# Patient Record
Sex: FEMALE | Race: WHITE | NOT HISPANIC OR LATINO | Employment: UNEMPLOYED | URBAN - METROPOLITAN AREA
[De-identification: names, ages, dates, MRNs, and addresses within clinical notes are randomized per-mention and may not be internally consistent; named-entity substitution may affect disease eponyms.]

---

## 2017-05-15 ENCOUNTER — OFFICE VISIT (OUTPATIENT)
Dept: FAMILY MEDICINE | Facility: HOSPITAL | Age: 47
End: 2017-05-15
Attending: FAMILY MEDICINE
Payer: MEDICAID

## 2017-05-15 VITALS
BODY MASS INDEX: 31.17 KG/M2 | SYSTOLIC BLOOD PRESSURE: 152 MMHG | HEART RATE: 97 BPM | HEIGHT: 60 IN | DIASTOLIC BLOOD PRESSURE: 79 MMHG | WEIGHT: 158.75 LBS

## 2017-05-15 DIAGNOSIS — Z00.00 HEALTH CARE MAINTENANCE: Primary | ICD-10-CM

## 2017-05-15 DIAGNOSIS — E10.3593 TYPE 1 DIABETES MELLITUS WITH PROLIFERATIVE RETINOPATHY OF BOTH EYES WITHOUT MACULAR EDEMA: ICD-10-CM

## 2017-05-15 DIAGNOSIS — H66.92 CHRONIC LEFT MIDDLE EAR INFECTION: ICD-10-CM

## 2017-05-15 PROBLEM — E10.39 TYPE 1 DIABETES MELLITUS WITH OPHTHALMIC COMPLICATION: Status: ACTIVE | Noted: 2017-05-15

## 2017-05-15 PROCEDURE — 99203 OFFICE O/P NEW LOW 30 MIN: CPT | Performed by: FAMILY MEDICINE

## 2017-05-15 RX ORDER — DEXAMETHASONE SODIUM PHOSPHATE 1 MG/ML
2 SOLUTION/ DROPS OPHTHALMIC EVERY 12 HOURS
Qty: 5 ML | Refills: 0 | Status: SHIPPED | OUTPATIENT
Start: 2017-05-15 | End: 2017-05-25

## 2017-05-15 RX ORDER — SYRINGE AND NEEDLE,INSULIN,1ML 31GX15/64"
1 SYRINGE, EMPTY DISPOSABLE MISCELLANEOUS
Qty: 100 SYRINGE | Refills: 6 | Status: SHIPPED | OUTPATIENT
Start: 2017-05-15 | End: 2017-05-29

## 2017-05-15 RX ORDER — CIPROFLOXACIN HYDROCHLORIDE 3 MG/ML
4 SOLUTION/ DROPS OPHTHALMIC EVERY 12 HOURS
Qty: 56 DROP | Refills: 0 | Status: SHIPPED | OUTPATIENT
Start: 2017-05-15 | End: 2017-05-22

## 2017-05-15 RX ORDER — INSULIN PUMP SYRINGE, 3 ML
EACH MISCELLANEOUS
Qty: 1 EACH | Refills: 0 | Status: SHIPPED | OUTPATIENT
Start: 2017-05-15 | End: 2017-08-10 | Stop reason: CLARIF

## 2017-05-15 RX ORDER — CIPROFLOXACIN HYDROCHLORIDE 3 MG/ML
1 SOLUTION/ DROPS OPHTHALMIC EVERY 4 HOURS
Qty: 42 DROP | Refills: 0 | Status: SHIPPED | OUTPATIENT
Start: 2017-05-15 | End: 2017-05-15 | Stop reason: SDUPTHER

## 2017-05-15 NOTE — MR AVS SNAPSHOT
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 - Subcutaneous    Pharmacy: [facility] [address] [place], [place] [address] [id number] Ph #: [phone]       insulin regular 100 unit/mL Inj injection 18 mL 3 5/15/2017 2017    Inject 10 Units into the skin 2 (two) times daily before meals. - Subcutaneous    Pharmacy: [facility] [address] [place], [place] [address] - [id number] Ph #: [phone]       blood sugar diagnostic Strp 200 each 3 5/15/2017     1 strip by Misc.(Non-Drug; Combo Route) route 4 (four) times daily before meals and nightly. - Misc.(Non-Drug; Combo Route)    Pharmacy: [facility] [address] [place], [place] [address] - [address] Ph #: [phone]       blood-glucose meter (FREESTYLE SYSTEM KIT) kit 1 each 0 5/15/2017 5/15/2018    Use as instructed    Pharmacy: [facility] [address] [place], [place] [address] - [id number] Ph #: [phone]       lancets 32 gauge Misc 100 each 11 5/15/2017     1  "lancet by Misc.(Non-Drug; Combo Route) route 4 (four) times daily before meals and nightly. - Misc.(Non-Drug; Combo Route)    Pharmacy: [address] Ph #: [phone]       dexamethasone (DECADRON) 0.1 % ophthalmic solution 5 mL 0 5/15/2017 2017    Place 2 drops into both eyes every 12 (twelve) hours. - Both Eyes    Pharmacy: [address] Ph #: [phone]       ciprofloxacin HCl (CILOXAN) 0.3 % ophthalmic solution 56 drop 0 5/15/2017 2017    Place 4 drops into the left ear every 12 (twelve) hours. - Left Ear    Pharmacy: [address] Ph #: [phone]       insulin syringe-needle U-100 0.3 mL 31 gauge x 15/64" Syrg 100 Syringe 6 5/15/2017 5/15/2018    1 application by Misc.(Non-Drug; Combo Route) route 4 (four) times daily before meals and nightly. - Misc.(Non-Drug; Combo Route)    Pharmacy: [address] Ph #: [phone]         [facility]     [facility] On Call Nurse Care Line -  Assistance  Unless otherwise directed by your provider, please contact [facility], our nurse care line that is available for  assistance.     Registered nurses in the [facility] provide: appointment scheduling, clinical advisement, health education, and other advisory services.  Call: [phone] (toll free)               Medications           START taking these NEW medications        Refills    blood sugar diagnostic Strp 3    Si strip by Misc.(Non-Drug; Combo Route) route 4 (four) times daily before meals and nightly.    Class: Normal    Route: Misc.(Non-Drug; Combo Route)    blood-glucose meter (FREESTYLE SYSTEM KIT) kit 0    Sig: Use as instructed    Class: Normal    lancets 32 gauge Misc 11    Si lancet by Misc.(Non-Drug; Combo Route) route 4 (four) times daily before meals and nightly.    Class: Normal    Route: Misc.(Non-Drug; " "Combo Route)    dexamethasone (DECADRON) 0.1 % ophthalmic solution 0    Sig: Place 2 drops into both eyes every 12 (twelve) hours.    Class: Normal    Route: Both Eyes    ciprofloxacin HCl (CILOXAN) 0.3 % ophthalmic solution 0    Sig: Place 4 drops into the left ear every 12 (twelve) hours.    Class: Normal    Route: Left Ear    insulin syringe-needle U-100 0.3 mL 31 gauge x 15/64" Syrg 6    Si application by Misc.(Non-Drug; Combo Route) route 4 (four) times daily before meals and nightly.    Class: Normal    Route: Misc.(Non-Drug; Combo Route)           Verify that the below list of medications is an accurate representation of the medications you are currently taking.  If none reported, the list may be blank. If incorrect, please contact your healthcare provider. Carry this list with you in case of emergency.           Current Medications     insulin NPH (NOVOLIN N) 100 unit/mL injection Inject 10 Units into the skin 2 (two) times daily before meals.    insulin regular 100 unit/mL Inj injection Inject 10 Units into the skin 2 (two) times daily before meals.    blood sugar diagnostic Strp 1 strip by Misc.(Non-Drug; Combo Route) route 4 (four) times daily before meals and nightly.    blood-glucose meter (FREESTYLE SYSTEM KIT) kit Use as instructed    ciprofloxacin HCl (CILOXAN) 0.3 % ophthalmic solution Place 4 drops into the left ear every 12 (twelve) hours.    dexamethasone (DECADRON) 0.1 % ophthalmic solution Place 2 drops into both eyes every 12 (twelve) hours.    furosemide (LASIX) 20 MG tablet Take 1 tablet (20 mg total) by mouth once daily.    insulin syringe-needle U-100 0.3 mL 31 gauge x 15/64" Syrg 1 application by Misc.(Non-Drug; Combo Route) route 4 (four) times daily before meals and nightly.    lancets 32 gauge Misc 1 lancet by Misc.(Non-Drug; Combo Route) route 4 (four) times daily before meals and nightly.           Clinical Reference Information           Your Vitals Were     BP Pulse Height " Weight Last Period BMI    152/79 97 5' (1.524 m) 72 kg (158 lb 11.7 oz) 04/17/2017 31 kg/m2      Blood Pressure          Most Recent Value    BP  (!)  152/79      Allergies as of 5/15/2017     Sulfa (Sulfonamide Antibiotics)      Immunizations Administered on Date of Encounter - 5/15/2017     None      Orders Placed During Today's Visit     Future Labs/Procedures Expected by Expires    CK  5/15/2017 7/14/2018    Hemoglobin A1c  5/15/2017 7/14/2018    Lipid panel  5/15/2017 7/14/2018    Microalbumin/creatinine urine ratio  5/15/2017 7/14/2018    TSH  5/15/2017 7/14/2018      [facility] Sign-Up     Activating your [facility] account is as easy as 1-2-3!     1) Visit [email or web address], select Sign Up Now, enter this activation code and your date of birth, then select Next.  [email or web address]  Expires: 6/8/2017  2:07 PM      2) Create a username and password to use when you visit [facility] in the future and select a security question in case you lose your password and select Next.    3) Enter your e-mail address and click Sign Up!    Additional Information  If you have questions, please e-mail [email or web address] or call [phone] to talk to our [facility] staff. Remember, [facility] is NOT to be used for urgent needs. For medical emergencies, dial 911.         Language Assistance Services     ATTENTION: Language assistance services are available, free of charge. Please call [phone].      ATENCIÓN: Si habla español, tiene a sun disposición servicios gratuitos de asistencia lingüística. Llame al [phone].     KAREN Ý: N?u b?n nói Ti?ng Vi?t, có các d?ch v? h? tr? ngôn ng? mi?n phí dành cho b?n. G?i s? [phone].         [facility] complies with applicable Federal civil rights laws and does not discriminate on the basis of race, color, national origin, age, disability, or sex.

## 2017-05-15 NOTE — PROGRESS NOTES
Subjective:       Patient ID: Sharmaine Matthews is a 47 y.o. female.    Chief Complaint: Establish Care    HPI   46 yo female, w/ h/o T1DM with diabetic retinopathy, presents to [facility]. Patient recently got medicaid and wanted to get a check up. Patient takes NPH 20U bid and novolin 10 units with meals to control her BS. Patient states her last A1c is 10 but has ranged from 6-10. Patient was diagnosed at 10 yrs of age. She was tried on an insulin pump but at that time insulin pumps were massive and patient stated it did not work well for her. She sees an opthlamo for her diabetic retinopathy. She required having laser photocoagulation therapy in both eyes. Patient states she also has this ear ache as well. She went to  and was prescribed amoxil x 7 days. She states the discharge and pain stopped but she still feels a fullness. Patient states she had tubes when she was a little kid due to the amount of ear infections she would get. Patient denies f/c, n/v/d, cp, sob, dizziness, or diaphoresis.     Review of Systems   Constitutional: Negative for chills and fever.   HENT: Positive for ear pain (fulness). Negative for congestion, ear discharge, rhinorrhea, sneezing and sore throat.    Eyes: Negative for photophobia and pain.   Respiratory: Negative for cough, shortness of breath and wheezing.    Cardiovascular: Negative for chest pain, palpitations and leg swelling.   Gastrointestinal: Negative for abdominal pain, constipation, diarrhea, nausea and vomiting.   Genitourinary: Negative for dysuria.   Neurological: Negative for dizziness.       Objective:      Vitals:    05/15/17 1459   BP: (!) 152/79   Pulse: 97     Physical Exam   Constitutional: She is oriented to person, place, and time. No distress.   HENT:   Head: Normocephalic and atraumatic.   Right Ear: Tympanic membrane normal. No drainage or swelling.   Left Ear: There is drainage (minor) and swelling. Tympanic membrane is injected. Decreased hearing is  noted.   Eyes: Pupils are equal, round, and reactive to light.   Neck: Neck supple. No JVD present.   Cardiovascular: Normal rate, regular rhythm, normal heart sounds and intact distal pulses.  Exam reveals no gallop and no friction rub.    No murmur heard.  Pulmonary/Chest: Effort normal and breath sounds normal. She has no wheezes. She has no rales.   Abdominal: Soft. Bowel sounds are normal. She exhibits no distension. There is no tenderness.   Musculoskeletal: She exhibits no edema.   Neurological: She is alert and oriented to person, place, and time.   Skin: Skin is warm and dry.   Psychiatric: She has a normal mood and affect. Her behavior is normal. Judgment and thought content normal.       Assessment:       1. Health care maintenance    2. 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 F/u A1C. Most likely will transition her into long acting insulin after results for better control.  -     Hemoglobin A1c; Future; Expected date: 5/15/17  -     insulin NPH (NOVOLIN N) 100 unit/mL injection; Inject 10 Units into the skin 2 (two) times daily before meals.  Dispense: 18 mL; Refill: 3  -     insulin regular 100 unit/mL Inj injection; Inject 10 Units into the skin 2 (two) times daily before meals.  Dispense: 18 mL; Refill: 3  -     blood sugar diagnostic Strp; 1 strip by Misc.(Non-Drug; Combo Route) route 4 (four) times daily before meals and nightly.  Dispense: 200 each; Refill: 3  -     blood-glucose meter (FREESTYLE SYSTEM KIT) kit; Use as instructed  Dispense: 1 each; Refill: 0  -     lancets 32 gauge Misc; 1 lancet by Misc.(Non-Drug; Combo Route) route 4 (four) times daily before meals and nightly.  Dispense:  "100 each; Refill: 11  -     Lipid panel; Future; Expected date: 5/15/17  -     CK; Future; Expected date: 5/15/17  -     Microalbumin/creatinine urine ratio; Future; Expected date: 5/15/17  -     insulin syringe-needle U-100 0.3 mL 31 gauge x 15/64" Syrg; 1 application by Misc.(Non-Drug; Combo Route) route 4 (four) times daily before meals and nightly.  Dispense: 100 Syringe; Refill: 6    Chronic left middle ear infection  - Was tried on amoxil. Will attempt with cipro drops x 7 days due to patient still having discharge and ear fullness.  -     dexamethasone (DECADRON) 0.1 % ophthalmic solution; Place 2 drops into both eyes every 12 (twelve) hours.  Dispense: 5 mL; Refill: 0  -     ciprofloxacin HCl (CILOXAN) 0.3 % ophthalmic solution; Place 4 drops into the left ear every 12 (twelve) hours.  Dispense: 56 drop; Refill: 0      Return in about 2 weeks (around 5/29/2017).      "

## 2017-05-16 NOTE — PROGRESS NOTES
I assume primary medical responsibility for this patient, I have reviewed the case history, findings, diagnosis and treatment plan with the resident and agree that the care is reasonable and necessary. This service has been performed by a resident without the presence of a teaching physician under the primary care exception  Gypsy Velasquez  5/16/2017

## 2017-05-22 ENCOUNTER — TELEPHONE (OUTPATIENT)
Dept: HEPATOLOGY | Facility: HOSPITAL | Age: 47
End: 2017-05-22

## 2017-05-25 ENCOUNTER — TELEPHONE (OUTPATIENT)
Dept: FAMILY MEDICINE | Facility: HOSPITAL | Age: 47
End: 2017-05-25

## 2017-05-25 NOTE — TELEPHONE ENCOUNTER
----- Message from Katja Umana sent at 5/22/2017  1:27 PM CDT -----  Contact: SELF / [phone]  CALLING REGARDING HER ABNORMAL LABS

## 2017-05-29 ENCOUNTER — OFFICE VISIT (OUTPATIENT)
Dept: FAMILY MEDICINE | Facility: HOSPITAL | Age: 47
End: 2017-05-29
Attending: FAMILY MEDICINE
Payer: MEDICAID

## 2017-05-29 VITALS
HEIGHT: 60 IN | SYSTOLIC BLOOD PRESSURE: 146 MMHG | BODY MASS INDEX: 30.69 KG/M2 | WEIGHT: 156.31 LBS | HEART RATE: 107 BPM | DIASTOLIC BLOOD PRESSURE: 93 MMHG

## 2017-05-29 DIAGNOSIS — E10.3593 TYPE 1 DIABETES MELLITUS WITH PROLIFERATIVE RETINOPATHY OF BOTH EYES WITHOUT MACULAR EDEMA: ICD-10-CM

## 2017-05-29 DIAGNOSIS — H66.002 ACUTE SUPPURATIVE OTITIS MEDIA OF LEFT EAR WITHOUT SPONTANEOUS RUPTURE OF TYMPANIC MEMBRANE, RECURRENCE NOT SPECIFIED: Primary | ICD-10-CM

## 2017-05-29 PROCEDURE — 99213 OFFICE O/P EST LOW 20 MIN: CPT | Performed by: FAMILY MEDICINE

## 2017-05-29 RX ORDER — CALCIUM CARB/VITAMIN D3/VIT K1 500-100-40
TABLET,CHEWABLE ORAL
COMMUNITY
Start: 2017-05-15 | End: 2017-05-29

## 2017-05-29 RX ORDER — ACETAMINOPHEN 160 MG/5ML
1 SUSPENSION, ORAL (FINAL DOSE FORM) ORAL
Qty: 100 EACH | Refills: 1 | Status: SHIPPED | OUTPATIENT
Start: 2017-05-29 | End: 2017-08-07 | Stop reason: SDUPTHER

## 2017-05-29 NOTE — PROGRESS NOTES
Subjective:       Patient ID: Sharmaine Matthews is a 47 y.o. female.    Chief Complaint: Ear Fullness    HPI   48 yo female w/ h/o T1DM, presents for a lab follow up. Patient's A1C is 11.3. Patient states for her that is usually a good value. Patient is adamant she wants to continue her NPH and her sliding scale. She states due to her sliding scale she uses more insulin than is sent to her over the course of 3 months. Patient's other complaint is about her L ear fullness. She states she just feels she has pressure in the ear. She was given cipro at last visit and states she feels the infection has resolved. She denies anymore discharge or f/c.    Review of Systems   Constitutional: Negative for chills and fever.   HENT: Positive for ear pain. Negative for congestion.    Respiratory: Negative for shortness of breath and wheezing.    Cardiovascular: Negative for chest pain, palpitations and leg swelling.   Gastrointestinal: Negative for abdominal pain, constipation, diarrhea, nausea and vomiting.   Genitourinary: Negative for dysuria.   Neurological: Negative for dizziness.       Objective:      Vitals:    05/29/17 1358   BP: (!) 146/93   Pulse: 107     Physical Exam   Constitutional: She is oriented to person, place, and time. She appears well-developed and well-nourished.   HENT:   Head: Normocephalic and atraumatic.   Left Ear: There is swelling. A middle ear effusion is present.   Scarring in L ear   Eyes: Pupils are equal, round, and reactive to light.   Neck: Neck supple. No JVD present.   Cardiovascular: Normal rate, regular rhythm, normal heart sounds and intact distal pulses.  Exam reveals no gallop and no friction rub.    No murmur heard.  Pulmonary/Chest: Effort normal and breath sounds normal. She has no wheezes. She has no rales.   Abdominal: Soft. Bowel sounds are normal. There is no tenderness.   Musculoskeletal: She exhibits no edema.   Neurological: She is alert and oriented to person, place, and time.  "  Skin: Skin is warm and dry.   Psychiatric: She has a normal mood and affect. Her behavior is normal. Judgment and thought content normal.       Assessment:       1. Acute suppurative otitis media of left ear without spontaneous rupture of tympanic membrane, recurrence not specified    2. 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   - Increased the amount of insulin for the patient so she had enough for the 3 months.  -     insulin NPH (NOVOLIN N) 100 unit/mL injection; Inject 10 Units into the skin 3 (three) times daily with meals.  Dispense: 27 mL; Refill: 3  -     insulin regular 100 unit/mL Inj injection; Inject 10 Units into the skin 3 (three) times daily before meals.  Dispense: 27 mL; Refill: 3  -     insulin syringe-needle U-100 0.3 mL 30 gauge x 1/2" Syrg; 1 application by Misc.(Non-Drug; Combo Route) route 4 (four) times daily before meals and nightly.  Dispense: 100 each; Refill: 1      Return in about 3 months (around 8/29/2017).      "

## 2017-05-30 NOTE — PROGRESS NOTES
I assume primary medical responsibility for this patient, I have reviewed the case history, findings, diagnosis and treatment plan with the resident and agree that the care is reasonable and necessary. This service has been performed by a resident without the presence of a teaching physician under the primary care exception  Gypsy Velasquez  5/30/2017

## 2017-08-07 DIAGNOSIS — E10.3593 TYPE 1 DIABETES MELLITUS WITH PROLIFERATIVE RETINOPATHY OF BOTH EYES WITHOUT MACULAR EDEMA: ICD-10-CM

## 2017-08-08 RX ORDER — SYRING-NEEDL,DISP,INSUL,0.3 ML 30 G X1/2"
SYRINGE, EMPTY DISPOSABLE MISCELLANEOUS
Qty: 100 EACH | Refills: 6 | Status: SHIPPED | OUTPATIENT
Start: 2017-08-08 | End: 2017-08-10 | Stop reason: SDUPTHER

## 2017-08-10 ENCOUNTER — OFFICE VISIT (OUTPATIENT)
Dept: FAMILY MEDICINE | Facility: HOSPITAL | Age: 47
End: 2017-08-10
Attending: FAMILY MEDICINE
Payer: MEDICAID

## 2017-08-10 ENCOUNTER — HOSPITAL ENCOUNTER (OUTPATIENT)
Dept: RADIOLOGY | Facility: HOSPITAL | Age: 47
Discharge: HOME | End: 2017-08-10
Attending: FAMILY MEDICINE
Payer: MEDICAID

## 2017-08-10 ENCOUNTER — CLINICAL SUPPORT (OUTPATIENT)
Dept: LAB | Facility: HOSPITAL | Age: 47
End: 2017-08-10
Attending: FAMILY MEDICINE
Payer: MEDICAID

## 2017-08-10 VITALS
BODY MASS INDEX: 31.38 KG/M2 | SYSTOLIC BLOOD PRESSURE: 149 MMHG | DIASTOLIC BLOOD PRESSURE: 76 MMHG | WEIGHT: 159.81 LBS | HEART RATE: 92 BPM | HEIGHT: 60 IN

## 2017-08-10 DIAGNOSIS — Z01.818 PREOPERATIVE CLEARANCE: ICD-10-CM

## 2017-08-10 DIAGNOSIS — I10 ESSENTIAL HYPERTENSION: ICD-10-CM

## 2017-08-10 DIAGNOSIS — E10.3593 TYPE 1 DIABETES MELLITUS WITH PROLIFERATIVE RETINOPATHY OF BOTH EYES WITHOUT MACULAR EDEMA: Primary | ICD-10-CM

## 2017-08-10 DIAGNOSIS — E78.5 HYPERLIPIDEMIA, UNSPECIFIED HYPERLIPIDEMIA TYPE: ICD-10-CM

## 2017-08-10 PROCEDURE — 93005 ELECTROCARDIOGRAM TRACING: CPT

## 2017-08-10 PROCEDURE — 71010 XR CHEST 1 VIEW: CPT | Mod: 26,,, | Performed by: RADIOLOGY

## 2017-08-10 PROCEDURE — 71010 XR CHEST 1 VIEW: CPT | Mod: TC

## 2017-08-10 PROCEDURE — 99213 OFFICE O/P EST LOW 20 MIN: CPT | Mod: 25 | Performed by: FAMILY MEDICINE

## 2017-08-10 RX ORDER — LISINOPRIL 5 MG/1
5 TABLET ORAL DAILY
Qty: 90 TABLET | Refills: 3 | Status: SHIPPED | OUTPATIENT
Start: 2017-08-10 | End: 2018-08-10

## 2017-08-10 RX ORDER — LISINOPRIL 5 MG/1
5 TABLET ORAL DAILY
Qty: 90 TABLET | Refills: 3 | Status: SHIPPED | OUTPATIENT
Start: 2017-08-10 | End: 2017-08-10 | Stop reason: SDUPTHER

## 2017-08-10 RX ORDER — ATORVASTATIN CALCIUM 20 MG/1
20 TABLET, FILM COATED ORAL DAILY
Qty: 90 TABLET | Refills: 3 | Status: SHIPPED | OUTPATIENT
Start: 2017-08-10 | End: 2018-08-10

## 2017-08-10 RX ORDER — OFLOXACIN 3 MG/ML
SOLUTION AURICULAR (OTIC)
COMMUNITY
Start: 2017-08-01 | End: 2017-08-10

## 2017-08-10 RX ORDER — LANCETS 28 GAUGE
EACH MISCELLANEOUS
COMMUNITY

## 2017-08-10 RX ORDER — ATORVASTATIN CALCIUM 20 MG/1
20 TABLET, FILM COATED ORAL DAILY
Qty: 90 TABLET | Refills: 3 | Status: SHIPPED | OUTPATIENT
Start: 2017-08-10 | End: 2017-08-10 | Stop reason: SDUPTHER

## 2017-08-10 RX ORDER — ACETAMINOPHEN 160 MG/5ML
SUSPENSION, ORAL (FINAL DOSE FORM) ORAL
Qty: 100 EACH | Refills: 6 | Status: SHIPPED | OUTPATIENT
Start: 2017-08-10

## 2017-08-10 NOTE — TELEPHONE ENCOUNTER
Patient states her insurance will no longer cover her old glucometer test strips. Requests rx for True Metrix glucometer, test strips, and alcohol pads.

## 2017-08-10 NOTE — PROGRESS NOTES
Subjective:       Patient ID: Sharmaine Matthews is a 47 y.o. female.    Chief Complaint: Follow-up (clearance for surgery)    HPI   Ms. Matthews is here for medical optimization for her tympanoplasty of her left ear. She reports complete hearing loss and ear pain for the past 4 months. Patient reports PSH of adenectomy and tympanostomy at 9 years old without any complications or reaction to anesthesia. She reports sulfa drug allergy. Patient denies any CP, SOB, N/V/D, F/C, dizziness or unintentional weight loss.     Patient has a PMH of DM1 and had last A1C of 11.5. Opthalmic exam performed a couple of weeks ago was normal (per patient). She reports monitoring her glucose levels daily (with am level of 250). BP is 149/76. Last 3 visits, patient has elevated BP.    Review of Systems   Constitutional: Negative for appetite change, chills, fatigue, fever and unexpected weight change.   HENT: Positive for ear pain (left) and hearing loss (Left ). Negative for ear discharge, sinus pressure, sore throat and trouble swallowing.    Eyes: Negative for pain and visual disturbance.   Respiratory: Negative for cough, chest tightness and shortness of breath.    Cardiovascular: Negative for chest pain, palpitations and leg swelling.   Gastrointestinal: Negative for abdominal distention, abdominal pain, constipation, diarrhea, nausea and vomiting.   Genitourinary: Negative for dysuria, enuresis, flank pain, frequency, hematuria and urgency.   Musculoskeletal: Negative for arthralgias, back pain, gait problem, neck pain and neck stiffness.   Neurological: Negative for dizziness, speech difficulty, weakness, numbness and headaches.   Psychiatric/Behavioral: Negative for agitation and confusion. The patient is not hyperactive.        Objective:      Vitals:    08/10/17 0857   BP: (!) 149/76   Pulse: 92     Physical Exam   Constitutional: She is oriented to person, place, and time. She appears well-developed and well-nourished.   HENT:  "  Right Ear: Hearing, tympanic membrane, external ear and ear canal normal.   Left Ear: External ear normal. No drainage. Tympanic membrane is perforated. Decreased hearing is noted.   Eyes: Conjunctivae and EOM are normal. Pupils are equal, round, and reactive to light.   Neck: Normal range of motion.   Cardiovascular: Normal rate, regular rhythm and normal heart sounds.  Exam reveals no gallop and no friction rub.    No murmur heard.  Pulmonary/Chest: Effort normal and breath sounds normal. She has no wheezes. She has no rales.   Abdominal: Soft. Bowel sounds are normal.   Musculoskeletal: Normal range of motion.   Neurological: She is alert and oriented to person, place, and time. She has normal reflexes.   Skin: Skin is warm.   Psychiatric: She has a normal mood and affect. Her behavior is normal. Judgment and thought content normal.       Assessment:       1. 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 Hyperlipidemia, unspecified hyperlipidemia type    3. Preoperative clearance    4. 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  Dispense: 27 mL; Refill: 3  -     insulin regular 100 unit/mL Inj injection; Inject 10 Units into the skin 3 (three) times daily before meals.  Dispense: 27 mL; Refill: 3  -     lancets 32 gauge Misc; 1 lancet by Misc.(Non-Drug; Combo Route) route 4 (four) times daily before meals and nightly.  Dispense: 100 each; Refill: 11  -     blood sugar diagnostic Strp; 1 strip by Misc.(Non-Drug; Combo Route) route 4 (four) times daily before meals and nightly.  Dispense: 200 each; Refill: 3  -     insulin syringe-needle U-100 (SURE COMFORT INSULIN SYRINGE) 0.3 mL 30 gauge x 1/2" Syrg; " use FOUR TIMES A DAY before meals and nightly  Dispense: 100 each; Refill: 6  - Will bring log with her to her next visit. She will have foot exam next visit as part of her exam.     Hyperlipidemia, unspecified hyperlipidemia type  -     atorvastatin (LIPITOR) 20 MG tablet; Take 1 tablet (20 mg total) by mouth once daily.  Dispense: 90 tablet; Refill: 3    Preoperative clearance  -     CBC auto differential; Future; Expected date: 08/10/2017  -     Basic metabolic panel; Future; Expected date: 08/10/2017  -     X-Ray Chest 1 View; Future; Expected date: 08/10/2017  -     EKG 12-lead; Future  - Patient will be back in a week after CBC, BMP, CXR and EKG results are back which were required by the physician performing the procedure.     Essential hypertension  - BP elevated x 3 visits. Will start on lisinopril   -     lisinopril (PRINIVIL,ZESTRIL) 5 MG tablet; Take 1 tablet (5 mg total) by mouth once daily.  Dispense: 90 tablet; Refill: 3      RTC 1 week

## 2017-08-10 NOTE — TELEPHONE ENCOUNTER
----- Message from Karishma Umana sent at 8/10/2017 11:04 AM CDT -----  Contact: self   Pt needs a new glucometer machine, test strips, lancets and alcohol wipes.

## 2017-08-12 RX ORDER — ISOPROPYL ALCOHOL 70 ML/100ML
1 SWAB TOPICAL
Qty: 200 EACH | Refills: 11 | Status: CANCELLED | OUTPATIENT
Start: 2017-08-12

## 2017-08-12 RX ORDER — DEXTROSE 4 G
TABLET,CHEWABLE ORAL
Qty: 1 EACH | Refills: 0 | Status: CANCELLED | OUTPATIENT
Start: 2017-08-12

## 2017-08-15 ENCOUNTER — TELEPHONE (OUTPATIENT)
Dept: FAMILY MEDICINE | Facility: HOSPITAL | Age: 47
End: 2017-08-15

## 2017-08-15 NOTE — TELEPHONE ENCOUNTER
----- Message from Martha Higgins sent at 8/11/2017  4:32 PM CDT -----  Pt need a prescription for true metrix  Meter please fax it to [facility].

## 2017-08-16 ENCOUNTER — OFFICE VISIT (OUTPATIENT)
Dept: FAMILY MEDICINE | Facility: HOSPITAL | Age: 47
End: 2017-08-16
Payer: MEDICAID

## 2017-08-16 VITALS
BODY MASS INDEX: 31.99 KG/M2 | SYSTOLIC BLOOD PRESSURE: 145 MMHG | DIASTOLIC BLOOD PRESSURE: 77 MMHG | WEIGHT: 162.94 LBS | HEART RATE: 86 BPM | HEIGHT: 60 IN

## 2017-08-16 DIAGNOSIS — R80.9 MICROALBUMINURIA: ICD-10-CM

## 2017-08-16 DIAGNOSIS — E78.5 HYPERLIPIDEMIA, UNSPECIFIED HYPERLIPIDEMIA TYPE: ICD-10-CM

## 2017-08-16 DIAGNOSIS — I10 ESSENTIAL HYPERTENSION: ICD-10-CM

## 2017-08-16 DIAGNOSIS — E10.3593 TYPE 1 DIABETES MELLITUS WITH PROLIFERATIVE RETINOPATHY OF BOTH EYES WITHOUT MACULAR EDEMA: Primary | ICD-10-CM

## 2017-08-16 PROCEDURE — 99214 OFFICE O/P EST MOD 30 MIN: CPT | Performed by: FAMILY MEDICINE

## 2017-08-16 RX ORDER — ISOPROPYL ALCOHOL 70 ML/100ML
SWAB TOPICAL
Qty: 140 EACH | Refills: 3 | COMMUNITY
Start: 2017-08-16

## 2017-08-16 RX ORDER — INSULIN PUMP SYRINGE, 3 ML
EACH MISCELLANEOUS
Qty: 1 EACH | Refills: 0 | Status: SHIPPED | OUTPATIENT
Start: 2017-08-16 | End: 2018-08-16

## 2017-08-16 NOTE — PROGRESS NOTES
Subjective:       Patient ID: Sharmaine Matthews is a 47 y.o. female.    Chief Complaint: Other (clearance)    HPI     Ms. Matthews is a 46 yo F here for follow up of required lab work for medical optimization for her tympanoplasty of left ear. She had BMP, CBC and A1C on 8/10/17. Surgical history includes adenectomy and tympanostomy at 9 years old without any complications or reaction to anesthesia. Reports allergy to sulfa drugs. Patient denies any CP, SOB, N/V/D, F/C, dizziness or unintentional weight loss.      Patient has a PMH of T1DM and last A1C was 11.5 on 5/15/17. Patient is currently on NPH 10 U BID and regular insulin 10 U BID. She reports monitoring her glucose levels daily with morning level of 250. Reports not starting lisinopril 5mg daily or lipitor 20 mg daily because of concerns of potential side effects.       Review of Systems   Constitutional: Negative for chills, diaphoresis, fatigue, fever and unexpected weight change.   HENT: Positive for ear pain (left) and hearing loss (left). Negative for ear discharge and sinus pressure.    Eyes: Negative for photophobia, pain and visual disturbance.   Respiratory: Negative for cough, chest tightness and shortness of breath.    Cardiovascular: Negative for chest pain, palpitations and leg swelling.   Gastrointestinal: Negative for abdominal distention, abdominal pain, blood in stool, constipation, diarrhea, nausea and vomiting.   Genitourinary: Negative for difficulty urinating, dysuria, flank pain, hematuria and urgency.   Musculoskeletal: Negative for arthralgias, back pain, joint swelling, myalgias, neck pain and neck stiffness.   Skin: Negative for wound.   Neurological: Negative for dizziness, tremors, seizures, syncope, speech difficulty, weakness, light-headedness, numbness and headaches.   Psychiatric/Behavioral: Negative for agitation, confusion, decreased concentration and hallucinations.       Objective:      Vitals:    08/16/17 0844   BP: (!) 145/77    Pulse: 86     Physical Exam   Constitutional: She is oriented to person, place, and time. She appears well-developed and well-nourished.   HENT:   Head: Normocephalic.   Left Ear: Tympanic membrane is perforated. Decreased hearing is noted.   Eyes: Conjunctivae and EOM are normal. Pupils are equal, round, and reactive to light.   Neck: Normal range of motion. Neck supple.   Cardiovascular: Normal rate, regular rhythm, normal heart sounds and intact distal pulses.  Exam reveals no gallop and no friction rub.    No murmur heard.  Pulmonary/Chest: Effort normal and breath sounds normal. She has no wheezes. She has no rales.   Abdominal: Soft. Bowel sounds are normal. She exhibits no distension. There is no tenderness.   Musculoskeletal: Normal range of motion.   Neurological: She is alert and oriented to person, place, and time.   Skin: Skin is warm and dry.   Psychiatric: She has a normal mood and affect. Her behavior is normal. Judgment and thought content normal.       A1C on 8/10/17: 11.8  Assessment:       1. 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  Dispense: 27 mL; Refill: 3  -     insulin NPH (NOVOLIN N) 100 unit/mL injection; Inject 15 Units into the skin 2 (two) times daily.  Dispense: 27 mL; Refill: 3  -     blood-glucose meter kit; Use as instructed  Dispense: 1 each; Refill: 0  -     alcohol swabs (ALCOHOL PADS) PadM; Use PRN  Dispense: 140 each; Refill: 3  -     Ambulatory consult to Diabetic Education    Patient is medically optimized for surgery. Increased insulin dosage to insulin NPH 15U BID and regular insulin 10U TID. Patient will follow up in 1 month and bring glucose logs. Discussed benefits and important of statin and  ace-inhibitor treatment. Patient stated she will take medication as prescribed.    Patient is medically optimized for tympanoplasty.    RTC in 1 month. Patient stated she would not be able to follow up earlier.

## 2017-08-17 NOTE — PROGRESS NOTES
I was present in clinic when the patient was seen and I discussed the case with  Dr. Kulkarni.  I have reviewed and agree with the assessment and plan.

## 2017-08-17 NOTE — TELEPHONE ENCOUNTER
Called pharmacy. Pt insurance is not able to cover the glucometer. Pharmacy staff states she will work it out with medicaid.

## 2017-08-18 ENCOUNTER — TELEPHONE (OUTPATIENT)
Dept: FAMILY MEDICINE | Facility: HOSPITAL | Age: 47
End: 2017-08-18

## 2017-08-18 NOTE — TELEPHONE ENCOUNTER
----- Message from Dedra Matson sent at 8/14/2017  4:37 PM CDT -----  PT CALL STATING THE PHARMACY IS WAITING ON DOCTOR TO SEND AND ORDER FOR CORRECT METER (TRUE METRIX). PATIENT NEEDS METER ASAP

## 2018-01-30 ENCOUNTER — TELEPHONE (OUTPATIENT)
Dept: FAMILY MEDICINE | Facility: HOSPITAL | Age: 48
End: 2018-01-30

## 2018-01-30 DIAGNOSIS — E10.3593 TYPE 1 DIABETES MELLITUS WITH PROLIFERATIVE RETINOPATHY OF BOTH EYES WITHOUT MACULAR EDEMA: ICD-10-CM

## 2018-01-30 NOTE — TELEPHONE ENCOUNTER
----- Message from Martha Higgins sent at 1/26/2018  4:46 PM CST -----  Pt need refills on her insulin insulin NPH (NOVOLIN N) 100 unit/mL injection  [facility]

## 2020-02-03 ENCOUNTER — TELEPHONE (OUTPATIENT)
Dept: PODIATRY | Facility: CLINIC | Age: 50
End: 2020-02-03

## 2020-02-03 NOTE — TELEPHONE ENCOUNTER
----- Message from Mery Servin DPM sent at 2/3/2020  3:38 PM CST -----  Yes will be happy to.    Thank you   ----- Message -----  From: Tawanda Robles MD  Sent: 2/2/2020  10:00 PM CST  To: Mery Servin DPM    Can you follow up with this patient in clinic? Diabetic, foot infection    sanjuana Robles MD, TYRELL, CPE, FACEP  Department of Emergency Medicine

## 2020-02-03 NOTE — TELEPHONE ENCOUNTER
Called Pt to schedule an appointment. Voicemail box had not been set up and I was unable to leave a message. Will try again to call tomorrow morning.

## 2020-02-04 ENCOUNTER — TELEPHONE (OUTPATIENT)
Dept: PODIATRY | Facility: CLINIC | Age: 50
End: 2020-02-04

## 2020-02-04 NOTE — TELEPHONE ENCOUNTER
"Called Pt regarding scheduling an appointment. Pt stated she did not want to make an appointment at this time. "Im still trying to heal and get rid of this infection" I asked her if she wanted to make an appointment with Dr. Servin that way he could evaluate her. Pt stated "I will give you a call when I am ready to start walking again and when I am ready to see someone." Pt was given phone number and directions to the office.    "

## 2020-02-04 NOTE — TELEPHONE ENCOUNTER
Called Pt in regards to scheduling an appointment, no answer and voicemail box is full. Will try to call again at a later time.

## 2020-02-04 NOTE — TELEPHONE ENCOUNTER
----- Message from Tawanda Robles MD sent at 2/3/2020  9:41 PM CST -----  Thank you    ----- Message -----  From: Mery Servin DPM  Sent: 2/3/2020   3:38 PM CST  To: Elida Osullivan LPN, Tawanda Robles MD, #    Yes will be happy to.    Thank you   ----- Message -----  From: Tawanda Robles MD  Sent: 2/2/2020  10:00 PM CST  To: Mery Servin DPM    Can you follow up with this patient in clinic? Diabetic, foot infection    sanjuana Robles MD, TYRELL, CPE, FACEP  Department of Emergency Medicine

## 2020-02-05 PROBLEM — E87.5 HYPERKALEMIA: Status: ACTIVE | Noted: 2020-02-05

## 2020-02-05 PROBLEM — L08.9 DIABETIC FOOT INFECTION: Status: ACTIVE | Noted: 2020-02-05

## 2020-02-05 PROBLEM — D72.829 LEUKOCYTOSIS: Status: ACTIVE | Noted: 2020-02-05

## 2020-02-05 PROBLEM — L97.425: Status: ACTIVE | Noted: 2020-02-05

## 2020-02-05 PROBLEM — N30.00 ACUTE CYSTITIS WITHOUT HEMATURIA: Status: ACTIVE | Noted: 2020-02-05

## 2020-02-05 PROBLEM — E11.628 DIABETIC FOOT INFECTION: Status: ACTIVE | Noted: 2020-02-05

## 2020-02-05 PROBLEM — E10.10 DIABETIC KETOACIDOSIS WITHOUT COMA ASSOCIATED WITH TYPE 1 DIABETES MELLITUS: Status: ACTIVE | Noted: 2020-02-05

## 2020-02-05 PROBLEM — E10.621: Status: ACTIVE | Noted: 2020-02-05

## 2020-02-06 PROBLEM — G93.41 ACUTE METABOLIC ENCEPHALOPATHY: Status: ACTIVE | Noted: 2020-02-06

## 2020-02-10 PROBLEM — D72.829 LEUKOCYTOSIS: Status: RESOLVED | Noted: 2020-02-05 | Resolved: 2020-02-10

## 2020-02-10 PROBLEM — E87.5 HYPERKALEMIA: Status: RESOLVED | Noted: 2020-02-05 | Resolved: 2020-02-10

## 2020-02-11 PROBLEM — G93.41 ACUTE METABOLIC ENCEPHALOPATHY: Status: RESOLVED | Noted: 2020-02-06 | Resolved: 2020-02-11

## 2020-02-14 ENCOUNTER — PATIENT OUTREACH (OUTPATIENT)
Dept: ADMINISTRATIVE | Facility: CLINIC | Age: 50
End: 2020-02-14

## 2020-02-14 NOTE — PATIENT INSTRUCTIONS
Wound Check After Surgery: Infection   Your surgical wound has become infected. Infection after surgery usually involves just the top layers of skin. Sometimes the infection is deeper in the wound and may involve a collection of fluid or pus. Treatment will depend on the type of infection you have.   Once antibiotic treatment is started the area of redness should not increase. Pain should start to decrease after two days of treatment.   Home Care:   Keep the wound clean and dry. Change the dressing as directed, or sooner, if the dressing becomes wet or stained with blood or fluid.   If you were told to clean the wound:   Remove the old bandage and wash the wound with soap and water.   Clean with hydrogen peroxide on a cotton tip applicator (Q-tip) to remove any crust or drainage.   Apply an antibiotic cream or ointment such as Neosporin or Bacitracin.   Reapply the bandage.  Bathe with a sponge (no shower or tub baths) for the first few days after surgery, or until there is no more drainage from you wound. Then, you may shower, but do not soak the area in water (no baths or swimming) until the sutures, staples or steri-strips are removed and any wound opening has healed and become dry.   If antibiotics have been prescribed, take them exactly as directed until they are all gone.   You may use acetaminophen (Tylenol) or ibuprofen (Motrin, Advil) to control pain, unless another medicine was prescribed. [NOTE: If you have chronic liver or kidney disease or ever had a stomach ulcer or GI bleeding, talk with your doctor before using these medicines.]  Follow Up   with your doctor as scheduled or as advised by our staff for your next wound check or suture/staple/tape removal.   Return Promptly   or contact your doctor if any of the following occurs:   Increasing pain at the site of surgery or pain not controlled by medicine prescribed   Fever of 100.4°F (38ºC) or higher, or as directed by your healthcare provider    Increasing redness around the wound   Fluid, pus or blood that continues to drain from the wound after five days of treatment   Vomiting, constipation or diarrhea   Shortness of breath or chest pain  © 7026-4055 [employer] [address] [place] [address], [place] [address]. All rights reserved. This information is not intended as a substitute for professional medical care. Always follow your healthcare professional's instructions.

## 2020-02-27 ENCOUNTER — OFFICE VISIT (OUTPATIENT)
Dept: PODIATRY | Facility: CLINIC | Age: 50
End: 2020-02-27
Payer: MEDICAID

## 2020-02-27 VITALS — HEART RATE: 74 BPM | DIASTOLIC BLOOD PRESSURE: 86 MMHG | SYSTOLIC BLOOD PRESSURE: 140 MMHG

## 2020-02-27 DIAGNOSIS — Z09 FOLLOW-UP EXAMINATION FOLLOWING SURGERY: Primary | ICD-10-CM

## 2020-02-27 DIAGNOSIS — E10.621 TYPE 1 DIABETES MELLITUS WITH LEFT DIABETIC FOOT ULCER: ICD-10-CM

## 2020-02-27 DIAGNOSIS — L97.529 TYPE 1 DIABETES MELLITUS WITH LEFT DIABETIC FOOT ULCER: ICD-10-CM

## 2020-02-27 PROCEDURE — 99024 POSTOP FOLLOW-UP VISIT: CPT | Mod: ,,, | Performed by: PODIATRIST

## 2020-02-27 PROCEDURE — 99999 PR PBB SHADOW E&M-EST. PATIENT-LVL III: ICD-10-PCS | Mod: PBBFAC,,, | Performed by: PODIATRIST

## 2020-02-27 PROCEDURE — 99213 OFFICE O/P EST LOW 20 MIN: CPT | Mod: PBBFAC,PN | Performed by: PODIATRIST

## 2020-02-27 PROCEDURE — 99024 PR POST-OP FOLLOW-UP VISIT: ICD-10-PCS | Mod: ,,, | Performed by: PODIATRIST

## 2020-02-27 PROCEDURE — 99999 PR PBB SHADOW E&M-EST. PATIENT-LVL III: CPT | Mod: PBBFAC,,, | Performed by: PODIATRIST

## 2020-02-27 RX ORDER — TRAMADOL HYDROCHLORIDE 50 MG/1
50 TABLET ORAL EVERY 12 HOURS PRN
Qty: 20 TABLET | Refills: 0 | Status: SHIPPED | OUTPATIENT
Start: 2020-02-27

## 2020-02-27 NOTE — PROGRESS NOTES
Subjective:      Patient ID: Sharmaine Matthews is a 50 y.o. female.    Chief Complaint: Post-op Evaluation (left foot)    50 y.o. female presenting with postop evaluation s/p L foot DM foot infection I&D and washout. (DOS: 2/11/20 GP: 2/22/20). Has been WB minimally on her heel in sx shoe. With her mother. Recently came back from [place]. Pain is controlled. Requesting some more pain med. Taking oral abx. Today is the last day.     Hemoglobin A1C   Date Value Ref Range Status   02/06/2020 13.4 (H) 4.0 - 5.6 % Final     Comment:     ADA Screening Guidelines:  5.7-6.4%  Consistent with prediabetes  >or=6.5%  Consistent with diabetes  High levels of fetal hemoglobin interfere with the HbA1C  assay. Heterozygous hemoglobin variants (HbS, HgC, etc)do  not significantly interfere with this assay.   However, presence of multiple variants may affect accuracy.     08/10/2017 11.8 (H) 4.0 - 5.6 % Final     Comment:     According to ADA guidelines, hemoglobin A1c <7.0% represents  optimal control in non-pregnant diabetic patients. Different  metrics may apply to specific patient populations.   Standards of Medical Care in Diabetes-2016.  For the purpose of screening for the presence of diabetes:  <5.7%     Consistent with the absence of diabetes  5.7-6.4%  Consistent with increasing risk for diabetes   (prediabetes)  >or=6.5%  Consistent with diabetes  Currently, no consensus exists for use of hemoglobin A1c  for diagnosis of diabetes for children.  This Hemoglobin A1c assay has significant interference with fetal   hemoglobin   (HbF). The results are invalid for patients with abnormal amounts of   HbF,   including those with known Hereditary Persistence   of Fetal Hemoglobin. Heterozygous hemoglobin variants (HbAS, HbAC,   HbAD, HbAE, HbA2) do not significantly interfere with this assay;   however, presence of multiple variants in a sample may impact the %   interference.     05/15/2017 11.5 (H) 4.5 - 6.2 % Final     Comment:      According to ADA guidelines, hemoglobin A1C <7.0% represents  optimal control in non-pregnant diabetic patients.  Different  metrics may apply to specific populations.   Standards of Medical Care in Diabetes - 2016.  For the purpose of screening for the presence of diabetes:  <5.7%     Consistent with the absence of diabetes  5.7-6.4%  Consistent with increasing risk for diabetes   (prediabetes)  >or=6.5%  Consistent with diabetes  Currently no consensus exists for use of hemoglobin A1C  for diagnosis of diabetes for children.         Review of Systems   Constitution: Negative for chills, decreased appetite, fever and malaise/fatigue.   HENT: Negative for congestion, ear discharge and sore throat.    Eyes: Negative for discharge and pain.   Cardiovascular: Negative for chest pain, claudication and leg swelling.   Respiratory: Negative for cough and shortness of breath.    Skin: Negative for color change, nail changes and rash.   Musculoskeletal: Negative for arthritis, joint pain, joint swelling and muscle weakness.        L foot pain    Gastrointestinal: Negative for bloating, abdominal pain, diarrhea, nausea and vomiting.   Genitourinary: Negative for flank pain and hematuria.   Neurological: Negative for headaches, numbness and weakness.   Psychiatric/Behavioral: Negative for altered mental status.             Past Medical History:   Diagnosis Date    Diabetes mellitus        Past Surgical History:   Procedure Laterality Date    DEBRIDEMENT OF FOOT Left 2/7/2020    Procedure: DEBRIDEMENT, FOOT;  Surgeon: Mery Servin DPM;  [facility];  Service: Podiatry;  Laterality: Left;    IRRIGATION AND DEBRIDEMENT OF LOWER EXTREMITY Left 2/11/2020    Procedure: IRRIGATION AND DEBRIDEMENT, LOWER EXTREMITY;  Surgeon: Mery Servin DPM;  [facility];  Service: Podiatry;  Laterality: Left;       History reviewed. No pertinent family history.    Social History     Socioeconomic History    Marital status: Single  "    Spouse name: Not on file    Number of children: Not on file    Years of education: Not on file    Highest education level: Not on file   Occupational History    Not on file   Social Needs    Financial resource strain: Not on file    Food insecurity:     Worry: Not on file     Inability: Not on file    Transportation needs:     Medical: Not on file     Non-medical: Not on file   Tobacco Use    Smoking status: Never Smoker   Substance and Sexual Activity    Alcohol use: No    Drug use: No    Sexual activity: Not on file   Lifestyle    Physical activity:     Days per week: Not on file     Minutes per session: Not on file    Stress: Not on file   Relationships    Social connections:     Talks on phone: Not on file     Gets together: Not on file     Attends Cheondoism service: Not on file     Active member of club or organization: Not on file     Attends meetings of clubs or organizations: Not on file     Relationship status: Not on file   Other Topics Concern    Not on file   Social History Narrative    Not on file       Current Outpatient Medications   Medication Sig Dispense Refill    BLOOD SUGAR DIAGNOSTIC (TRUE METRIX GLUCOSE TEST STRIP MISC) 1 strip by Misc.(Non-Drug; Combo Route) route 4 (four) times daily before meals and nightly.      BLOOD-GLUCOSE METER (TRUE METRIX GLUCOSE METER MISC) Check blood sugar before each meal and nightly.      doxycycline (MONODOX) 100 MG capsule Take 1 capsule (100 mg total) by mouth every 12 (twelve) hours. 28 capsule 0    insulin  unit/mL injection Inject 20 Units into the skin 2 (two) times daily. for 25 days 10 mL 0    insulin syringe-needle U-100 (SURE COMFORT INSULIN SYRINGE) 0.3 mL 30 gauge x 1/2" Syrg use FOUR TIMES A DAY before meals and nightly 100 each 6    lancets (FREESTYLE LANCETS) 28 gauge Misc by Misc.(Non-Drug; Combo Route) route.      lancets 32 gauge Misc 1 lancet by Misc.(Non-Drug; Combo Route) route 4 (four) times daily before " meals and nightly. 100 each 11    traMADol (ULTRAM) 50 mg tablet Take 1 tablet (50 mg total) by mouth every 12 (twelve) hours as needed. 20 tablet 0    alcohol swabs (ALCOHOL PADS) PadM Use PRN (Patient not taking: Reported on 2/27/2020) 140 each 3    atorvastatin (LIPITOR) 20 MG tablet Take 1 tablet (20 mg total) by mouth once daily. 90 tablet 3    furosemide (LASIX) 20 MG tablet Take 1 tablet (20 mg total) by mouth once daily. 7 tablet 0    insulin regular 100 unit/mL Inj injection Inject 10 Units into the skin 3 (three) times daily before meals. 27 mL 3    lisinopril (PRINIVIL,ZESTRIL) 5 MG tablet Take 1 tablet (5 mg total) by mouth once daily. 90 tablet 3     No current facility-administered medications for this visit.        Review of patient's allergies indicates:   Allergen Reactions    Sulfa (sulfonamide antibiotics)        Vitals:    02/27/20 1049   BP: (!) 140/86   Pulse: 74       Objective:      Physical Exam   Constitutional: She is oriented to person, place, and time. She appears well-developed and well-nourished. No distress.   HENT:   Nose: Nose normal.   Eyes: Conjunctivae are normal.   Neck: Normal range of motion.   Pulmonary/Chest: Effort normal. She exhibits no tenderness.   Abdominal: There is no tenderness.   Neurological: She is alert and oriented to person, place, and time.   Psychiatric: She has a normal mood and affect. Her behavior is normal.     Vascular: Distal DP/PT pulses palpable 2/4. CRT < 3 sec to tips of toes. No vericosities noted to LEs. Hair growth present LE, warm to touch LE,   Left foot:  mild edema plantar aspect of the midfoot     Dermatologic:   Left foot: s/p L foot I&D, wound plantar midfoot. Suture c/d/i. No wound dehiscence, mild rubor but no erythema plantar foot.       Musculoskeletal: Passive range of motion of ankle and pedal joints is painless. No calf tenderness LE, Compartments soft/compressible. .      Neurological: Light touch, proprioception, and  sharp/dull sensation are all intact. Protective threshold with the Macon-Wienstein monofilament is intact b/l. Vibratory sensation intact.                Assessment:       Encounter Diagnoses   Name Primary?    Follow-up examination following surgery Yes    Type 1 diabetes mellitus with left diabetic foot ulcer          Plan:       Sharmaine was seen today for post-op evaluation.    Diagnoses and all orders for this visit:    Follow-up examination following surgery    Type 1 diabetes mellitus with left diabetic foot ulcer    Other orders  -     traMADol (ULTRAM) 50 mg tablet; Take 1 tablet (50 mg total) by mouth every 12 (twelve) hours as needed.      I counseled the patient on her conditions, their implications and medical management.    50 y.o. female with s/p L foot I&D/WO with primary closure.    -suture removed. Tolerated well. Verbal consent was obtained. Wound healed. WBAT in CAM. Short CAM dispensed. LWC with betadine with band aid.   -rx. Tramadol    -Advised for optimal glucose control and maintenance per primary care physician. Patient was also educated on healthy diet that is naturally rich in nutrients and low in fat and calories.   -The nature of the condition, options for management, as well as potential risks and complications were discussed in detail with patient. Patient was amenable to my recommendations and left my office fully informed and will follow up as instructed or sooner if necessary.    -Patient was advised of signs and symptoms of infection including redness, drainage, purulence, odor, streaking, fever, chills and I advised patient to seek medical attention (ER or urgent care) if these symptoms arise.   -f/u 3 weeks     Note dictated with voice recognition software, please excuse any grammatical errors.

## 2020-03-18 ENCOUNTER — OFFICE VISIT (OUTPATIENT)
Dept: PODIATRY | Facility: CLINIC | Age: 50
End: 2020-03-18
Payer: MEDICAID

## 2020-03-18 VITALS
HEIGHT: 60 IN | HEART RATE: 100 BPM | BODY MASS INDEX: 30.57 KG/M2 | DIASTOLIC BLOOD PRESSURE: 84 MMHG | SYSTOLIC BLOOD PRESSURE: 131 MMHG

## 2020-03-18 DIAGNOSIS — L97.529 TYPE 1 DIABETES MELLITUS WITH LEFT DIABETIC FOOT ULCER: Primary | ICD-10-CM

## 2020-03-18 DIAGNOSIS — E10.621 TYPE 1 DIABETES MELLITUS WITH LEFT DIABETIC FOOT ULCER: Primary | ICD-10-CM

## 2020-03-18 PROCEDURE — 99999 PR PBB SHADOW E&M-EST. PATIENT-LVL III: ICD-10-PCS | Mod: PBBFAC,,, | Performed by: PODIATRIST

## 2020-03-18 PROCEDURE — 99213 PR OFFICE/OUTPT VISIT, EST, LEVL III, 20-29 MIN: ICD-10-PCS | Mod: S$PBB,,, | Performed by: PODIATRIST

## 2020-03-18 PROCEDURE — 99213 OFFICE O/P EST LOW 20 MIN: CPT | Mod: PBBFAC,PN | Performed by: PODIATRIST

## 2020-03-18 PROCEDURE — 99213 OFFICE O/P EST LOW 20 MIN: CPT | Mod: S$PBB,,, | Performed by: PODIATRIST

## 2020-03-18 PROCEDURE — 99999 PR PBB SHADOW E&M-EST. PATIENT-LVL III: CPT | Mod: PBBFAC,,, | Performed by: PODIATRIST

## 2020-03-18 NOTE — PROGRESS NOTES
Subjective:      Patient ID: Sharmaine Matthews is a 50 y.o. female.    Chief Complaint: Follow-up (left foot)    50 y.o. female presenting with postop evaluation s/p L foot DM foot infection I&D and washout. (DOS: 2/11/20 GP: 2/22/20). Has been WB minimally on her heel in sx shoe. With her mother. Recently came back from [place]. Pain is controlled. Requesting some more pain med. Taking oral abx. Today is the last day.     3/18/20 f/u s/p L foot washout and primary closure. Denies pain. Has been WBAT in [facility]. Her last glucose was 199.       Hemoglobin A1C   Date Value Ref Range Status   02/06/2020 13.4 (H) 4.0 - 5.6 % Final     Comment:     ADA Screening Guidelines:  5.7-6.4%  Consistent with prediabetes  >or=6.5%  Consistent with diabetes  High levels of fetal hemoglobin interfere with the HbA1C  assay. Heterozygous hemoglobin variants (HbS, HgC, etc)do  not significantly interfere with this assay.   However, presence of multiple variants may affect accuracy.     08/10/2017 11.8 (H) 4.0 - 5.6 % Final     Comment:     According to ADA guidelines, hemoglobin A1c <7.0% represents  optimal control in non-pregnant diabetic patients. Different  metrics may apply to specific patient populations.   Standards of Medical Care in Diabetes-2016.  For the purpose of screening for the presence of diabetes:  <5.7%     Consistent with the absence of diabetes  5.7-6.4%  Consistent with increasing risk for diabetes   (prediabetes)  >or=6.5%  Consistent with diabetes  Currently, no consensus exists for use of hemoglobin A1c  for diagnosis of diabetes for children.  This Hemoglobin A1c assay has significant interference with fetal   hemoglobin   (HbF). The results are invalid for patients with abnormal amounts of   HbF,   including those with known Hereditary Persistence   of Fetal Hemoglobin. Heterozygous hemoglobin variants (HbAS, HbAC,   HbAD, HbAE, HbA2) do not significantly interfere with this assay;   however, presence of multiple variants  in a sample may impact the %   interference.     05/15/2017 11.5 (H) 4.5 - 6.2 % Final     Comment:     According to ADA guidelines, hemoglobin A1C <7.0% represents  optimal control in non-pregnant diabetic patients.  Different  metrics may apply to specific populations.   Standards of Medical Care in Diabetes - 2016.  For the purpose of screening for the presence of diabetes:  <5.7%     Consistent with the absence of diabetes  5.7-6.4%  Consistent with increasing risk for diabetes   (prediabetes)  >or=6.5%  Consistent with diabetes  Currently no consensus exists for use of hemoglobin A1C  for diagnosis of diabetes for children.         Review of Systems   Constitution: Negative for chills, decreased appetite, fever and malaise/fatigue.   HENT: Negative for congestion, ear discharge and sore throat.    Eyes: Negative for discharge and pain.   Cardiovascular: Negative for chest pain, claudication and leg swelling.   Respiratory: Negative for cough and shortness of breath.    Skin: Negative for color change, nail changes and rash.   Musculoskeletal: Negative for arthritis, joint pain, joint swelling and muscle weakness.   Gastrointestinal: Negative for bloating, abdominal pain, diarrhea, nausea and vomiting.   Genitourinary: Negative for flank pain and hematuria.   Neurological: Negative for headaches, numbness and weakness.   Psychiatric/Behavioral: Negative for altered mental status.             Past Medical History:   Diagnosis Date    Diabetes mellitus        Past Surgical History:   Procedure Laterality Date    DEBRIDEMENT OF FOOT Left 2/7/2020    Procedure: DEBRIDEMENT, FOOT;  Surgeon: Mery Servin DPM;  Location: [facility] OR;  Service: Podiatry;  Laterality: Left;    IRRIGATION AND DEBRIDEMENT OF LOWER EXTREMITY Left 2/11/2020    Procedure: IRRIGATION AND DEBRIDEMENT, LOWER EXTREMITY;  Surgeon: Mery Servin DPM;  Location: [facility] OR;  Service: Podiatry;  Laterality: Left;       History reviewed. No pertinent  "family history.    Social History     Socioeconomic History    Marital status: Single     Spouse name: Not on file    Number of children: Not on file    Years of education: Not on file    Highest education level: Not on file   Occupational History    Not on file   Social Needs    Financial resource strain: Not on file    Food insecurity:     Worry: Not on file     Inability: Not on file    Transportation needs:     Medical: Not on file     Non-medical: Not on file   Tobacco Use    Smoking status: Never Smoker   Substance and Sexual Activity    Alcohol use: No    Drug use: No    Sexual activity: Not on file   Lifestyle    Physical activity:     Days per week: Not on file     Minutes per session: Not on file    Stress: Not on file   Relationships    Social connections:     Talks on phone: Not on file     Gets together: Not on file     Attends Gnosticism service: Not on file     Active member of club or organization: Not on file     Attends meetings of clubs or organizations: Not on file     Relationship status: Not on file   Other Topics Concern    Not on file   Social History Narrative    Not on file       Current Outpatient Medications   Medication Sig Dispense Refill    BLOOD SUGAR DIAGNOSTIC (TRUE METRIX GLUCOSE TEST STRIP MISC) 1 strip by Misc.(Non-Drug; Combo Route) route 4 (four) times daily before meals and nightly.      BLOOD-GLUCOSE METER (TRUE METRIX GLUCOSE METER MISC) Check blood sugar before each meal and nightly.      doxycycline (MONODOX) 100 MG capsule Take 1 capsule (100 mg total) by mouth every 12 (twelve) hours. 28 capsule 0    insulin syringe-needle U-100 (SURE COMFORT INSULIN SYRINGE) 0.3 mL 30 gauge x 1/2" Syrg use FOUR TIMES A DAY before meals and nightly 100 each 6    lancets (FREESTYLE LANCETS) 28 gauge Misc by Misc.(Non-Drug; Combo Route) route.      lancets 32 gauge Misc 1 lancet by Misc.(Non-Drug; Combo Route) route 4 (four) times daily before meals and nightly. 100 " each 11    traMADol (ULTRAM) 50 mg tablet Take 1 tablet (50 mg total) by mouth every 12 (twelve) hours as needed. 20 tablet 0    alcohol swabs (ALCOHOL PADS) PadM Use PRN (Patient not taking: Reported on 3/18/2020) 140 each 3    atorvastatin (LIPITOR) 20 MG tablet Take 1 tablet (20 mg total) by mouth once daily. 90 tablet 3    furosemide (LASIX) 20 MG tablet Take 1 tablet (20 mg total) by mouth once daily. 7 tablet 0    insulin  unit/mL injection Inject 20 Units into the skin 2 (two) times daily. for 25 days 10 mL 0    insulin regular 100 unit/mL Inj injection Inject 10 Units into the skin 3 (three) times daily before meals. 27 mL 3    lisinopril (PRINIVIL,ZESTRIL) 5 MG tablet Take 1 tablet (5 mg total) by mouth once daily. 90 tablet 3     No current facility-administered medications for this visit.        Review of patient's allergies indicates:   Allergen Reactions    Sulfa (sulfonamide antibiotics)        Vitals:    03/18/20 1250   BP: 131/84   Pulse: 100   Height: 5' (1.524 m)   PainSc: 0-No pain       Objective:      Physical Exam   Constitutional: She is oriented to person, place, and time. She appears well-developed and well-nourished. No distress.   HENT:   Nose: Nose normal.   Eyes: Conjunctivae are normal.   Neck: Normal range of motion.   Pulmonary/Chest: Effort normal. She exhibits no tenderness.   Abdominal: There is no tenderness.   Neurological: She is alert and oriented to person, place, and time.   Psychiatric: She has a normal mood and affect. Her behavior is normal.     Vascular: Distal DP/PT pulses palpable 2/4. CRT < 3 sec to tips of toes. No vericosities noted to LEs. Hair growth present LE, warm to touch LE,      Dermatologic:   Left foot: s/p L foot I&D, healed incision. +superficial scab. No open wound. No signs of infection.      Musculoskeletal: Passive range of motion of ankle and pedal joints is painless. No calf tenderness LE, Compartments soft/compressible.  .      Neurological: Light touch, proprioception, and sharp/dull sensation are all intact. Protective threshold with the [place]-Wienstein monofilament is intact b/l. Vibratory sensation intact.       3/18/20           Assessment:       Encounter Diagnosis   Name Primary?    Type 1 diabetes mellitus with left diabetic foot ulcer Yes         Plan:       Sharmaine was seen today for follow-up.    Diagnoses and all orders for this visit:    Type 1 diabetes mellitus with left diabetic foot ulcer  -     DIABETIC SHOES FOR HOME USE      I counseled the patient on her conditions, their implications and medical management.    50 y.o. female with s/p L foot I&D/WO with primary closure.    -rx. DM shoe.   -incision completely healed. Superficial scab removed. Tolerated well. Verbal consent was obtained.     -Advised for optimal glucose control and maintenance per primary care physician. Patient was also educated on healthy diet that is naturally rich in nutrients and low in fat and calories.   -The nature of the condition, options for management, as well as potential risks and complications were discussed in detail with patient. Patient was amenable to my recommendations and left my office fully informed and will follow up as instructed or sooner if necessary.    -Patient was advised of signs and symptoms of infection including redness, drainage, purulence, odor, streaking, fever, chills and I advised patient to seek medical attention (ER or urgent care) if these symptoms arise.   -f/u 6 months.       Note dictated with voice recognition software, please excuse any grammatical errors.

## 2020-07-15 ENCOUNTER — OFFICE VISIT (OUTPATIENT)
Dept: PODIATRY | Facility: CLINIC | Age: 50
End: 2020-07-15
Payer: MEDICAID

## 2020-07-15 VITALS
RESPIRATION RATE: 16 BRPM | BODY MASS INDEX: 24.8 KG/M2 | SYSTOLIC BLOOD PRESSURE: 150 MMHG | HEIGHT: 60 IN | DIASTOLIC BLOOD PRESSURE: 72 MMHG | HEART RATE: 70 BPM | WEIGHT: 126.31 LBS

## 2020-07-15 DIAGNOSIS — E10.9 TYPE 1 DIABETES MELLITUS WITHOUT COMPLICATION: Primary | ICD-10-CM

## 2020-07-15 PROCEDURE — 99999 PR PBB SHADOW E&M-EST. PATIENT-LVL IV: CPT | Mod: PBBFAC,,, | Performed by: PODIATRIST

## 2020-07-15 PROCEDURE — 99999 PR PBB SHADOW E&M-EST. PATIENT-LVL IV: ICD-10-PCS | Mod: PBBFAC,,, | Performed by: PODIATRIST

## 2020-07-15 PROCEDURE — 99214 OFFICE O/P EST MOD 30 MIN: CPT | Mod: PBBFAC,PN | Performed by: PODIATRIST

## 2020-07-15 PROCEDURE — 99213 OFFICE O/P EST LOW 20 MIN: CPT | Mod: S$PBB,,, | Performed by: PODIATRIST

## 2020-07-15 PROCEDURE — 99213 PR OFFICE/OUTPT VISIT, EST, LEVL III, 20-29 MIN: ICD-10-PCS | Mod: S$PBB,,, | Performed by: PODIATRIST

## 2020-07-15 NOTE — PROGRESS NOTES
Subjective:      Patient ID: Sharmaine Matthews is a 50 y.o. female.    Chief Complaint: Follow-up (left foot)    50 y.o. female presenting with postop evaluation s/p L foot DM foot infection I&D and washout. (DOS: 2/11/20 GP: 2/22/20). Has been WB minimally on her heel in sx shoe. With her mother. Recently came back from [place]. Pain is controlled. Requesting some more pain med. Taking oral abx. Today is the last day.     3/18/20 f/u s/p L foot washout and primary closure. Denies pain. Has been WBAT in [facility]. Her last glucose was 199.     7/15/20 follow-up status post left foot washout and primary closure.  Patient is doing really get.  Ambulating in right high heel and asking me to dance with her.  Patient here for diabetic foot risk assessment as well.  Denies any pedal, ankle, lower limb complaints.  Last measured glucose was 178. She plans to move to [place], [place]. She has not gotten her DM shoe due to covid 19.         Hemoglobin A1C   Date Value Ref Range Status   02/06/2020 13.4 (H) 4.0 - 5.6 % Final     Comment:     ADA Screening Guidelines:  5.7-6.4%  Consistent with prediabetes  >or=6.5%  Consistent with diabetes  High levels of fetal hemoglobin interfere with the HbA1C  assay. Heterozygous hemoglobin variants (HbS, HgC, etc)do  not significantly interfere with this assay.   However, presence of multiple variants may affect accuracy.     08/10/2017 11.8 (H) 4.0 - 5.6 % Final     Comment:     According to ADA guidelines, hemoglobin A1c <7.0% represents  optimal control in non-pregnant diabetic patients. Different  metrics may apply to specific patient populations.   Standards of Medical Care in Diabetes-2016.  For the purpose of screening for the presence of diabetes:  <5.7%     Consistent with the absence of diabetes  5.7-6.4%  Consistent with increasing risk for diabetes   (prediabetes)  >or=6.5%  Consistent with diabetes  Currently, no consensus exists for use of hemoglobin A1c  for diagnosis of diabetes for  children.  This Hemoglobin A1c assay has significant interference with fetal   hemoglobin   (HbF). The results are invalid for patients with abnormal amounts of   HbF,   including those with known Hereditary Persistence   of Fetal Hemoglobin. Heterozygous hemoglobin variants (HbAS, HbAC,   HbAD, HbAE, HbA2) do not significantly interfere with this assay;   however, presence of multiple variants in a sample may impact the %   interference.     05/15/2017 11.5 (H) 4.5 - 6.2 % Final     Comment:     According to ADA guidelines, hemoglobin A1C <7.0% represents  optimal control in non-pregnant diabetic patients.  Different  metrics may apply to specific populations.   Standards of Medical Care in Diabetes - 2016.  For the purpose of screening for the presence of diabetes:  <5.7%     Consistent with the absence of diabetes  5.7-6.4%  Consistent with increasing risk for diabetes   (prediabetes)  >or=6.5%  Consistent with diabetes  Currently no consensus exists for use of hemoglobin A1C  for diagnosis of diabetes for children.         Review of Systems   Constitution: Negative for chills, decreased appetite, fever and malaise/fatigue.   HENT: Negative for congestion, ear discharge and sore throat.    Eyes: Negative for discharge and pain.   Cardiovascular: Negative for chest pain, claudication and leg swelling.   Respiratory: Negative for cough and shortness of breath.    Skin: Negative for color change, nail changes and rash.   Musculoskeletal: Negative for arthritis, joint pain, joint swelling and muscle weakness.   Gastrointestinal: Negative for bloating, abdominal pain, diarrhea, nausea and vomiting.   Genitourinary: Negative for flank pain and hematuria.   Neurological: Negative for headaches, numbness and weakness.   Psychiatric/Behavioral: Negative for altered mental status.             Past Medical History:   Diagnosis Date    Diabetes mellitus        Past Surgical History:   Procedure Laterality Date     DEBRIDEMENT OF FOOT Left 2/7/2020    Procedure: DEBRIDEMENT, FOOT;  Surgeon: Mery Servin DPM;  Location: [facility] OR;  Service: Podiatry;  Laterality: Left;    IRRIGATION AND DEBRIDEMENT OF LOWER EXTREMITY Left 2/11/2020    Procedure: IRRIGATION AND DEBRIDEMENT, LOWER EXTREMITY;  Surgeon: Mery Servin DPM;  Location: [facility] OR;  Service: Podiatry;  Laterality: Left;       History reviewed. No pertinent family history.    Social History     Socioeconomic History    Marital status: Single     Spouse name: Not on file    Number of children: Not on file    Years of education: Not on file    Highest education level: Not on file   Occupational History    Not on file   Social Needs    Financial resource strain: Not on file    Food insecurity     Worry: Not on file     Inability: Not on file    Transportation needs     Medical: Not on file     Non-medical: Not on file   Tobacco Use    Smoking status: Never Smoker   Substance and Sexual Activity    Alcohol use: No    Drug use: No    Sexual activity: Not on file   Lifestyle    Physical activity     Days per week: Not on file     Minutes per session: Not on file    Stress: Not on file   Relationships    Social connections     Talks on phone: Not on file     Gets together: Not on file     Attends Catholic service: Not on file     Active member of club or organization: Not on file     Attends meetings of clubs or organizations: Not on file     Relationship status: Not on file   Other Topics Concern    Not on file   Social History Narrative    Not on file       Current Outpatient Medications   Medication Sig Dispense Refill    BLOOD SUGAR DIAGNOSTIC (TRUE METRIX GLUCOSE TEST STRIP MISC) 1 strip by Misc.(Non-Drug; Combo Route) route 4 (four) times daily before meals and nightly.      BLOOD-GLUCOSE METER (TRUE METRIX GLUCOSE METER MISC) Check blood sugar before each meal and nightly.      doxycycline (MONODOX) 100 MG capsule Take 1 capsule (100 mg total) by mouth  "every 12 (twelve) hours. 28 capsule 0    insulin syringe-needle U-100 (SURE COMFORT INSULIN SYRINGE) 0.3 mL 30 gauge x 1/2" Syrg use FOUR TIMES A DAY before meals and nightly 100 each 6    lancets (FREESTYLE LANCETS) 28 gauge Misc by Misc.(Non-Drug; Combo Route) route.      lancets 32 gauge Misc 1 lancet by Misc.(Non-Drug; Combo Route) route 4 (four) times daily before meals and nightly. 100 each 11    traMADol (ULTRAM) 50 mg tablet Take 1 tablet (50 mg total) by mouth every 12 (twelve) hours as needed. 20 tablet 0    alcohol swabs (ALCOHOL PADS) PadM Use PRN (Patient not taking: Reported on 3/18/2020) 140 each 3    atorvastatin (LIPITOR) 20 MG tablet Take 1 tablet (20 mg total) by mouth once daily. 90 tablet 3    furosemide (LASIX) 20 MG tablet Take 1 tablet (20 mg total) by mouth once daily. 7 tablet 0    insulin  unit/mL injection Inject 20 Units into the skin 2 (two) times daily. for 25 days 10 mL 0    insulin regular 100 unit/mL Inj injection Inject 10 Units into the skin 3 (three) times daily before meals. 27 mL 3    lisinopril (PRINIVIL,ZESTRIL) 5 MG tablet Take 1 tablet (5 mg total) by mouth once daily. 90 tablet 3     No current facility-administered medications for this visit.        Review of patient's allergies indicates:   Allergen Reactions    Sulfa (sulfonamide antibiotics)        Vitals:    07/15/20 1045   BP: (!) 150/72   Pulse: 70   Resp: 16   Weight: 57.3 kg (126 lb 4.8 oz)   Height: 5' (1.524 m)   PainSc: 0-No pain       Objective:      Physical Exam  Constitutional:       General: She is not in acute distress.     Appearance: She is well-developed.   HENT:      Nose: Nose normal.   Eyes:      Conjunctiva/sclera: Conjunctivae normal.   Neck:      Musculoskeletal: Normal range of motion.   Pulmonary:      Effort: Pulmonary effort is normal.   Chest:      Chest wall: No tenderness.   Abdominal:      Tenderness: There is no abdominal tenderness.   Neurological:      Mental Status: " She is alert and oriented to person, place, and time.   Psychiatric:         Behavior: Behavior normal.       Vascular: Distal DP/PT pulses palpable 2/4. CRT < 3 sec to tips of toes. No vericosities noted to LEs. Hair growth present LE, warm to touch LE,      Dermatologic:   Left foot: s/p L foot I&D, healed incision. +superficial scab. No open wound. No signs of infection.      Musculoskeletal: Passive range of motion of ankle and pedal joints is painless. No calf tenderness LE, Compartments soft/compressible. .      Neurological: Light touch, proprioception, and sharp/dull sensation are all intact. Protective threshold with the [place]-Wienstein monofilament is intact b/l. Vibratory sensation intact.       3/18/20           Assessment:       Encounter Diagnosis   Name Primary?    Type 1 diabetes mellitus without complication Yes         Plan:       Sharmaine was seen today for follow-up.    Diagnoses and all orders for this visit:    Type 1 diabetes mellitus without complication      I counseled the patient on her conditions, their implications and medical management.    50 y.o. female with s/p L foot I&D/WO with primary closure.    -recovered well from surgery. Moving to [place] this month. Recommend to follow up/set up an appointment with podiatrist in [place].     -Advised for optimal glucose control and maintenance per primary care physician. Patient was also educated on healthy diet that is naturally rich in nutrients and low in fat and calories.   -The nature of the condition, options for management, as well as potential risks and complications were discussed in detail with patient. Patient was amenable to my recommendations and left my office fully informed and will follow up as instructed or sooner if necessary.    -Patient was advised of signs and symptoms of infection including redness, drainage, purulence, odor, streaking, fever, chills and I advised patient to seek medical attention (ER or urgent care) if these  symptoms arise.   -f/u as needed       Note dictated with voice recognition software, please excuse any grammatical errors.